# Patient Record
Sex: MALE | Race: BLACK OR AFRICAN AMERICAN | NOT HISPANIC OR LATINO | URBAN - METROPOLITAN AREA
[De-identification: names, ages, dates, MRNs, and addresses within clinical notes are randomized per-mention and may not be internally consistent; named-entity substitution may affect disease eponyms.]

---

## 2017-03-21 ENCOUNTER — EMERGENCY (EMERGENCY)
Facility: HOSPITAL | Age: 27
LOS: 0 days | Discharge: HOME | End: 2017-03-21
Attending: EMERGENCY MEDICINE
Payer: COMMERCIAL

## 2017-03-21 VITALS
SYSTOLIC BLOOD PRESSURE: 159 MMHG | HEART RATE: 90 BPM | RESPIRATION RATE: 18 BRPM | TEMPERATURE: 98 F | OXYGEN SATURATION: 100 % | DIASTOLIC BLOOD PRESSURE: 92 MMHG | HEIGHT: 71 IN | WEIGHT: 199.96 LBS

## 2017-03-21 DIAGNOSIS — M79.672 PAIN IN LEFT FOOT: ICD-10-CM
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
## 2017-03-21 DIAGNOSIS — B35.3 TINEA PEDIS: ICD-10-CM

## 2017-03-21 DIAGNOSIS — T14.8 OTHER INJURY OF UNSPECIFIED BODY REGION: ICD-10-CM

## 2017-03-21 DIAGNOSIS — Z79.1 LONG TERM (CURRENT) USE OF NON-STEROIDAL ANTI-INFLAMMATORIES (NSAID): ICD-10-CM
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
## 2017-03-21 PROCEDURE — 99283 EMERGENCY DEPT VISIT LOW MDM: CPT

## 2017-03-21 RX ORDER — AZTREONAM 2 G
1 VIAL (EA) INJECTION
Qty: 20 | Refills: 0 | OUTPATIENT
Start: 2017-03-21 | End: 2017-03-31

## 2017-03-21 NOTE — ED PROVIDER NOTE - PHYSICAL EXAMINATION
Skin- exfoliation of plantar surface of the left sole and wet appearing no bleeding no drainage nontender to palp.

## 2019-01-22 ENCOUNTER — EMERGENCY (EMERGENCY)
Facility: HOSPITAL | Age: 29
LOS: 1 days | Discharge: HOME | End: 2019-01-22
Attending: EMERGENCY MEDICINE | Admitting: EMERGENCY MEDICINE
Payer: SELF-PAY

## 2019-01-22 VITALS
DIASTOLIC BLOOD PRESSURE: 82 MMHG | OXYGEN SATURATION: 100 % | HEART RATE: 92 BPM | TEMPERATURE: 99 F | RESPIRATION RATE: 15 BRPM | SYSTOLIC BLOOD PRESSURE: 150 MMHG

## 2019-01-22 VITALS
SYSTOLIC BLOOD PRESSURE: 127 MMHG | DIASTOLIC BLOOD PRESSURE: 89 MMHG | OXYGEN SATURATION: 100 % | HEART RATE: 58 BPM | RESPIRATION RATE: 18 BRPM

## 2019-01-22 PROCEDURE — 99283 EMERGENCY DEPT VISIT LOW MDM: CPT

## 2019-01-22 PROCEDURE — 71101 X-RAY EXAM UNILAT RIBS/CHEST: CPT | Mod: 26,LT

## 2019-01-22 PROCEDURE — 73030 X-RAY EXAM OF SHOULDER: CPT | Mod: 26,LT

## 2019-01-22 PROCEDURE — 73050 X-RAY EXAM OF SHOULDERS: CPT | Mod: 26,52,LT

## 2019-01-22 RX ORDER — ACETAMINOPHEN 500 MG
975 TABLET ORAL ONCE
Qty: 0 | Refills: 0 | Status: COMPLETED | OUTPATIENT
Start: 2019-01-22 | End: 2019-01-22

## 2019-01-22 RX ORDER — IBUPROFEN 200 MG
1 TABLET ORAL
Qty: 56 | Refills: 0 | OUTPATIENT
Start: 2019-01-22 | End: 2019-02-04

## 2019-01-22 RX ORDER — DIAZEPAM 5 MG
1 TABLET ORAL
Qty: 3 | Refills: 0 | OUTPATIENT
Start: 2019-01-22 | End: 2019-01-24

## 2019-01-22 RX ORDER — IBUPROFEN 200 MG
800 TABLET ORAL ONCE
Qty: 0 | Refills: 0 | Status: COMPLETED | OUTPATIENT
Start: 2019-01-22 | End: 2019-01-22

## 2019-01-22 RX ORDER — DIAZEPAM 5 MG
5 TABLET ORAL ONCE
Qty: 0 | Refills: 0 | Status: DISCONTINUED | OUTPATIENT
Start: 2019-01-22 | End: 2019-01-22

## 2019-01-22 RX ADMIN — Medication 975 MILLIGRAM(S): at 21:46

## 2019-01-22 RX ADMIN — Medication 5 MILLIGRAM(S): at 21:49

## 2019-01-22 RX ADMIN — Medication 800 MILLIGRAM(S): at 21:46

## 2019-01-22 NOTE — ED PROVIDER NOTE - CARDIAC, MLM
Normal rate, regular rhythm.  Heart sounds S1, S2.  No murmurs, rubs or gallops. bilateral equal radial pulse

## 2019-01-22 NOTE — ED PROVIDER NOTE - OBJECTIVE STATEMENT
28 yr old male with hx of left AC joint separation presents to ed c/o left AC pain and left rib/neck, hip pain s/p fall around 9am today.  pt states pipes broke and soaked the floor.  pt ran to get his equipment and slipped while wearing slippers.  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  pt urinated afterwards and no visible blood, no sob.  pain gradually worsening with spasm sensation when moving neck and arm. 28 yr old male with hx of left AC joint separation 12/10/18 presents to ed c/o left AC pain and left rib/neck, hip pain s/p fall around 9am today.  pt states pipes broke and soaked the floor.  pt ran to get his equipment and slipped while wearing slippers.  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  pt urinated afterwards and no visible blood, no sob.  pain gradually worsening with spasm sensation when moving neck and arm.

## 2019-01-22 NOTE — ED PROVIDER NOTE - MEDICAL DECISION MAKING DETAILS
pt with fall and left side pain- based on [place] head/cervical ct- no need for imaging neck/head. no hematuria or skin findings to suggest intraabd or thorax injury.  will obtain xr of left AC/rib and shoulder.  sling and pain meds.  f/u with ortho and physical therapy

## 2019-01-22 NOTE — ED ADULT TRIAGE NOTE - CHIEF COMPLAINT QUOTE
alert no distress  s/p slip and fall on tile floor landed on left shoulder and head  has left shoulder deformity has difficulty moving  left shoulder   also c/o left hip left flank left rib pain left neck and left head   no c/o dizziness or blurry vision  no weakness

## 2019-01-22 NOTE — ED PROVIDER NOTE - PHYSICAL EXAMINATION
head NCAT  neck- no midline ttp, FROM, mildly limited when turning to right due to pain. ttp at left paracervical muscle

## 2019-01-22 NOTE — ED PROVIDER NOTE - MUSCULOSKELETAL, MLM
Spine appears normal, range of motion is limited at left shoulder- able to raise arm 50 degree, able to flex and extend.  left AC deformity noted, bilateral clavicle normal without any crepitus or pain or deformity

## 2019-12-27 NOTE — ED PROVIDER NOTE - EYES NEGATIVE STATEMENT, MLM
no discharge, no irritation, no pain, no redness, and no visual changes. Fluence In J: 8.4 Temperature: 10 C Pre-Procedure Text: After consent was obtained and the procedure was explained, all persons present in the exam room put on their protective eyewear. Cold gel was applied to the treatment areas. Procedural Text: The treatment areas where then treated as noted above. Price (Use Numbers Only, No Special Characters Or $): 0 Pulse Width In Msec: 12 Post-Procedure Text: Following the treatment, ice and sunblock was applied to the treatment areas.  Post-care instructions were discussed. Detail Level: Simple Treatment Number (Will Not Render If 0): 1 Spot Size: 6 Consent: Written consent obtained, risks reviewed including but not limited to crusting, scabbing, blistering, scarring, darker or lighter pigmentary change, and/or incomplete removal. Post-Care Instructions: I reviewed with the patient in detail post-care instructions. Patient is to apply vaseline with a q-tip to all crusted areas, and avoid picking at any scabs. Pt should stay away from the sun and wear sun protection until fully healed. Total Pulses: 28 Laser Type: KTP 532nm Fluence In J: 8 Pulse Width In Msec: 10 Total Pulses: 175

## 2023-01-10 NOTE — ED PROVIDER NOTE - OBJECTIVE STATEMENT
26 years old male walked in c/o skin breaks off from this left foot for 2 weeks. Pt sts he is able to walk in it. Pt denies focal/distal weakness or numbness. no

## 2023-03-21 NOTE — ED ADULT NURSE NOTE - CAS TRG GEN SKIN CONDITION
No psychiatric contraindications to discharge Warm No psychiatric contraindications to discharge No psychiatric contraindications to discharge

## 2025-08-19 ENCOUNTER — EMERGENCY (EMERGENCY)
Facility: HOSPITAL | Age: 35
LOS: 0 days | Discharge: HOME | End: 2025-08-20
Payer: OTHER MISCELLANEOUS

## 2025-08-19 VITALS
TEMPERATURE: 98 F | SYSTOLIC BLOOD PRESSURE: 123 MMHG | RESPIRATION RATE: 18 BRPM | OXYGEN SATURATION: 98 % | DIASTOLIC BLOOD PRESSURE: 78 MMHG | HEART RATE: 62 BPM

## 2025-08-19 VITALS
HEART RATE: 90 BPM | TEMPERATURE: 98 F | HEIGHT: 70 IN | OXYGEN SATURATION: 100 % | SYSTOLIC BLOOD PRESSURE: 163 MMHG | DIASTOLIC BLOOD PRESSURE: 99 MMHG | WEIGHT: 190.04 LBS | RESPIRATION RATE: 17 BRPM

## 2025-08-19 DIAGNOSIS — M54.2 CERVICALGIA: ICD-10-CM

## 2025-08-19 DIAGNOSIS — S09.90XA UNSPECIFIED INJURY OF HEAD, INITIAL ENCOUNTER: ICD-10-CM
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
## 2025-08-19 DIAGNOSIS — S01.112A LACERATION WITHOUT FOREIGN BODY OF LEFT EYELID AND PERIOCULAR AREA, INITIAL ENCOUNTER: ICD-10-CM

## 2025-08-19 PROCEDURE — 99284 EMERGENCY DEPT VISIT MOD MDM: CPT

## 2025-08-19 PROCEDURE — 70486 CT MAXILLOFACIAL W/O DYE: CPT | Mod: 26

## 2025-08-19 PROCEDURE — 70450 CT HEAD/BRAIN W/O DYE: CPT | Mod: 26

## 2025-08-19 RX ORDER — ACETAMINOPHEN 500 MG/5ML
650 LIQUID (ML) ORAL ONCE
Refills: 0 | Status: COMPLETED | OUTPATIENT
Start: 2025-08-19 | End: 2025-08-19

## 2025-08-19 RX ORDER — LIDOCAINE HYDROCHLORIDE 20 MG/ML
1 JELLY TOPICAL ONCE
Refills: 0 | Status: COMPLETED | OUTPATIENT
Start: 2025-08-19 | End: 2025-08-19

## 2025-08-19 RX ORDER — METHOCARBAMOL 500 MG/1
1500 TABLET, FILM COATED ORAL ONCE
Refills: 0 | Status: COMPLETED | OUTPATIENT
Start: 2025-08-19 | End: 2025-08-19

## 2025-08-19 RX ADMIN — METHOCARBAMOL 1500 MILLIGRAM(S): 500 TABLET, FILM COATED ORAL at 22:39

## 2025-08-19 RX ADMIN — LIDOCAINE HYDROCHLORIDE 1 PATCH: 20 JELLY TOPICAL at 22:40

## 2025-08-19 RX ADMIN — Medication 650 MILLIGRAM(S): at 22:39

## 2025-08-20 RX ORDER — METHOCARBAMOL 500 MG/1
2 TABLET, FILM COATED ORAL
Qty: 30 | Refills: 0
Start: 2025-08-20 | End: 2025-08-24